# Patient Record
Sex: FEMALE | Race: WHITE | Employment: OTHER | URBAN - METROPOLITAN AREA
[De-identification: names, ages, dates, MRNs, and addresses within clinical notes are randomized per-mention and may not be internally consistent; named-entity substitution may affect disease eponyms.]

---

## 2023-01-19 ENCOUNTER — HOSPITAL ENCOUNTER (OUTPATIENT)
Dept: PHYSICAL THERAPY | Age: 73
Setting detail: RECURRING SERIES
Discharge: HOME | End: 2023-01-22
Payer: COMMERCIAL

## 2023-01-19 PROCEDURE — 97165 OT EVAL LOW COMPLEX 30 MIN: CPT

## 2023-01-19 PROCEDURE — 97535 SELF CARE MNGMENT TRAINING: CPT

## 2023-01-19 NOTE — THERAPY EVALUATION
Kerri Schwartz  : 1950  Primary: Meridee Odor (Commercial)  Secondary:  [address] @ [facility]  9 [address] [place] [address] [place] [address]  Phone: [phone]  Fax: [phone]    OT Visit Info:  Certification Period Expiration Date: 23  Total # of Visits to Date: 1   Visit Count: Visit count could not be calculated. Make sure you are using a visit which is associated with an episode. OUTPATIENT OCCUPATIONAL THERAPY:OP NOTE TYPE: Initial Assessment 2023  Episode: (BLE lymphedema)   Appt Desk        Treatment Diagnosis:  I89 Lymphedema, not elsewhere classified  I87.2 Venous insufficiency  M79.604 Pain in right leg  M79.605 Pain in left leg  R53.1 Weakness  M62.81 Muscle weakness, generalized  R53.83 Other fatigue    Medical/Referring Diagnosis:  Lymphedema, not elsewhere classified [I89.0]  Referring Physician:  Jewell Villavicencio MD MD Orders:  OT Eval and Treat   Return MD Appt:  TBD  Date of Onset:  Onset Date: 21   Allergies:  Patient has no allergy information on record. Restrictions/Precautions:    No data recorded  No data recorded  Medications Last Reviewed:  2023     SUBJECTIVE   History of Injury/Illness (Reason for Referral):  2023: Office visit (Dr. Woo Eastman):   Karolina Cordon is a known patient to our clinic, had seen Dr. Latha Nelson many years ago, had ablation on the left GSV, and sclerotherapy on the right lower extremity. She has been developing worsening bilateral venous stasis dermatitis, more severe on the right than the left. She has [place], severe obesity with a BMI of 42, as her main contributing factors. Cardiac echo from 8 years ago, showed grade 1 diastolic dysfunction. Patient has developed a small ulceration on the right lower extremity, traumatic. She has been seen previously Dr. Ruben Melton. She is at high risk for recurrence of ulceration, due to severe lipodermatosclerosis.  We checked her bedside, which showed incompetent  vein in the right mid medial calf. Patient uses Lasix intermittently. She has not had a recent cardiac or pulmonary evaluation. She uses CPAP every night. Without it, she is unable to sleep. Disposition: Cardiac echo due to bilateral lower extremity edema, severe orthopnea requiring daily usage of CPAP, and longstanding diabetes, Lasix daily 40 mg, continue Velcro wrapping and referred for lymphedema therapy for MLD, standing venous ultrasound bilateral lower extremity. Patient Stated Goal(s):  \"Reduce swelling\"  Initial:      7/10 Post Session:      5/10  Past Medical History/Comorbidities:   Ms. Brandon Deosuza  has no past medical history on file. Ms. Brandon Desouza  has no past surgical history on file. Social History/Living Environment:   Lives With: Alone  Type of Home: House     Prior Level of Function/Work/Activity:   Prior level of function: Independent  Occupation: Retired  No data recorded  No data recorded     Learning:   No data recorded     Fall Risk Scale  Buck Total Score: 0  [place] Fall Risk: Low (0-24)         OBJECTIVE   Lymphedema:  PRETREATMENT AFFECTED LIMB(s): lower extremity      Date:  1/19/23         Right / Left           Groin   []      []           8 inches   []      []           4 inches   []      []         PoplitealSpace   []      [] R41  L40          8 inches   []      [] R40.5  L41          4 inches   []      [] R32  L28          Ankle   []      [] R22.5  L23.5          Instep   []      [] R21.25  L21        Measurements are taken in centimeters:  2.54 cm = 1 inch   Cumulative circumferential volumetric graph measurements  RLE total size 157.25        LLE total size 153.5               Skin Integumentary:  Location Description: BLE  Pitting Scale Area 1: 3+  Skin Color: Red  Skin Texture: Dry  Edema Rebound: Slow  Stemmer Sign: Negative  LLIS:  Total Score: 25    ASSESSMENT   Initial Assessment:  Patient with BLE early stage 2. Redness with no infection. Dry but uses lotion daily.  Patient has visual CVI. 2+ pitting edema. Patient will benefit from CDT and MLD with transition to daily compression. Initiate OT. Problem List (Impacting functional limitations):  Performance deficits / Impairments: Decreased functional mobility ; Decreased ADL status; Decreased ROM; Decreased strength     Therapy Prognosis:   No data recorded     Assessment Complexity:   Low Complexity  PLAN   Effective Dates: 2/04/84  TO Certification Period Expiration Date: 04/19/23 . Frequency/Duration: No data recorded   Interventions Planned: (Treatment may consist of any combination of the following)  Current Treatment Recommendations: Strengthening; ROM; Self-Care / ADL; Patient/Caregiver education & training; Safety education & training; Equipment evaluation, education, & procurement; Manual Therapy:  MLD     Short-Term Functional Goals: Time Frame: 30 days  1. The patient/caregiver will verbalize understanding of lymphedema precautions. 2. Patient will be independent with skin care regimen to decrease risk of cellulitis. 3. The patient/caregiver will be independent at donning and doffing bilateral lower extremity compression bandages. 4. The patient/caregiver will be independent with self-manual lymph drainage techniques and show decrease in limb volume. 5. Patient will be independent in lymphatic exercises. Discharge Goals: Time Frame: 90 days  1. Patient's bilateral lower extremity circumferential measurements will decrease on volumetric graph to maximize functional use in ADL's.    2. The patient/caregiver will be independent with home management of lymphedema. 3. Patient/caregiver will be independent donning and doffing bilateral lower extremity compression garment. MEDICAL NECESSITY:   > Skilled intervention continues to be required due to Deficits noted above. REASON FOR SERVICES/OTHER COMMENTS:  > Patient continues to require skilled intervention due to Dx above.   Total Duration:  Time In: 1100  Time Out: 1200    Regarding Vincent Knapp's therapy, I certify that the treatment plan above will be carried out by a therapist or under their direction.   Thank you for this referral,  Maninder Mar OT     Referring Physician Signature: Oralia Morton MD _______________________________ Date _____________        Post Session Pain  Charge Capture   POC Link  Treatment Note Link  MD Guidelines  [facility]

## 2023-01-19 NOTE — PROGRESS NOTES
Elder Costa  : 1950  Primary: Lisha Srivastava (Commercial)  Secondary:  [address] @ [facility]  [address] [place] [address]  Phone: [phone]  Fax: [phone]    OT Visit Info:   Certification Period Expiration Date: 23  Total # of Visits to Date: 1        OUTPATIENT OCCUPATIONAL THERAPY: Treatment Note 2023  Episode: (BLE lymphedema)   Appt Desk      Treatment Diagnosis:  I89 Lymphedema, not elsewhere classified  I87.2 Venous insufficiency  M79.604 Pain in right leg  M79.605 Pain in left leg  R53.1 Weakness  M62.81 Muscle weakness, generalized  R53.83 Other fatigue  Medical/Referring Diagnosis:  Lymphedema, not elsewhere classified [I89.0]  Referring Physician:  Chris Park MD MD Orders:  OT Eval and Treat   Date of Onset:  Onset Date: 21     Allergies:  Patient has no allergy information on record. Restrictions/Precautions:    No data recorded  No data recorded  Medications Last Reviewed:  2023     Interventions Planned: (Treatment may consist of any combination of the following)  Current Treatment Recommendations: Strengthening; ROM; Self-Care / ADL; Patient/Caregiver education & training; Safety education & training; Equipment evaluation, education, & procurement; Manual Therapy:  MLD     Subjective Comments: \"I can only come once a week due to copay and travel. \"     Initial:      7/10 Post Session:      5/10  Medications Last Reviewed:  2023  Updated Objective Findings:  See evaluation note from today  Treatment   Manual Lymph Drainage:   Lymph Nodes:    Cervical Supraclavicular Axillary Abdominal Inguinal Popliteal Antecubital   RIGHT []     []     []     []     []     []     []       LEFT []     []     []     []     []     []     []         Anastamoses:   Axillo-axillary Inguino-inguinal Axillo-inguinal Inguino-axillary   ANTERIOR []     []     []     []       POSTERIOR []     []     []     []       RIGHT []     [] []     []       LEFT []     []     []     []         Limbs:   []    RUE     []    LUE     []    RLE    []    LLE    Self Care: (30 minutes): Eucerin for skin care. Cotton liner. 2 short bandages each leg. Educated to remove if painful. Educated about exercises in bandages. Treatment/Session Summary:    Treatment Assessment:  Patient to wear compression wraps until sat if able. Apply Juzo socks after removal of wraps. Communication/Consultation:  None today  Equipment provided today:  4 bandages, 2 liners. Recommendations/Intent for next treatment session: Next visit will focus on MLD and CDT. Adolph Dodd     Total Treatment Billable Duration: 35 minutes  OT Individual Minutes  Time In: 1100  Time Out: 1200  Minutes: [address] [place] [address], OT    Post Session Pain  Charge Capture   POC Link  Treatment Note Link  MD Guidelines  MyChart    Future Appointments   Date Time Provider Braydon Mendes   1/25/2023  2:00 PM Virlinda Old, [facility]   2/2/2023 11:00 AM Virlinda Old, OT [facility]   2/9/2023 11:00 AM Virlinda Old, OT [facility]   2/15/2023 10:00 AM Virlinda Old, OT [facility]   2/22/2023 10:00 AM Virlinda Old, OT [facility]   3/1/2023 10:00 AM Virlinda Old, OT [facility]   3/8/2023 10:00 AM Virlinda Old, OT [facility]

## 2023-01-25 ENCOUNTER — HOSPITAL ENCOUNTER (OUTPATIENT)
Dept: PHYSICAL THERAPY | Age: 73
Setting detail: RECURRING SERIES
Discharge: HOME | End: 2023-01-28
Payer: COMMERCIAL

## 2023-01-25 PROCEDURE — 97140 MANUAL THERAPY 1/> REGIONS: CPT

## 2023-01-25 PROCEDURE — 97535 SELF CARE MNGMENT TRAINING: CPT

## 2023-01-25 NOTE — PROGRESS NOTES
Aishwarya Acuna  : 1950  Primary: [facility] (Commercial)  Secondary:  [address] @ [facility]  [address] [place] [address]  Phone: [phone]  Fax: [phone]    OT Visit Info:   Certification Period Expiration Date: 23  Total # of Visits to Date: 2        OUTPATIENT OCCUPATIONAL THERAPY: Treatment Note 2023  Episode: (BLE lymphedema)   Appt Desk      Treatment Diagnosis:  I89 Lymphedema, not elsewhere classified  I87.2 Venous insufficiency  M79.604 Pain in right leg  M79.605 Pain in left leg  R53.1 Weakness  M62.81 Muscle weakness, generalized  R53.83 Other fatigue  Medical/Referring Diagnosis:  Lymphedema, not elsewhere classified [I89.0]  Referring Physician:  Iman Bermeo MD MD Orders:  OT Eval and Treat   Date of Onset:  Onset Date: 21     Allergies:  Patient has no allergy information on record. Restrictions/Precautions:    No data recorded  No data recorded  Medications Last Reviewed:  2023     Interventions Planned: (Treatment may consist of any combination of the following)  Current Treatment Recommendations: Strengthening; ROM; Self-Care / ADL; Patient/Caregiver education & training; Safety education & training; Equipment evaluation, education, & procurement; Manual Therapy:  MLD     Subjective Comments: \"My legs are smaller and didn't really fill up again. \"     Initial:      7/10 Post Session:      5/10  Medications Last Reviewed:  2023  Updated Objective Findings:  See evaluation note from today  Treatment   Manual Lymph Drainage: (30 Minutes) Sitting up in recliner, legs elevated  Lymph Nodes:    Cervical Supraclavicular Axillary Abdominal Inguinal Popliteal Antecubital   RIGHT []     []     []     []     []     []     []       LEFT []     []     []     []     []     []     []         Anastamoses:   Axillo-axillary Inguino-inguinal Axillo-inguinal Inguino-axillary   ANTERIOR []     []     []     [] POSTERIOR []     []     []     []       RIGHT []     []     []     []       LEFT []     []     []     []         Limbs:   []    RUE     []    LUE     []    RLE    []    LLE    Self Care: (30 minutes): Eucerin for skin care. Cotton liner. 3 short bandages each leg. Educated to remove if painful. Educated about exercises in bandages. Added rosidal foam 1/26/23      Treatment/Session Summary:    Treatment Assessment:  Patient to wear until Friday and remove and apply Juzo compression after. Communication/Consultation:  None today  Equipment provided today:  2 bandages. Recommendations/Intent for next treatment session: Next visit will focus on MLD and CDT. Rosie Remedies     Total Treatment Billable Duration: 60 minutes  OT Individual Minutes  Time In: 7546  Time Out: 5431  Minutes: [address] [place] [address]    Post Session Pain  Charge Capture   POC Link  Treatment Note Link  MD Guidelines  MyChart    Future Appointments   Date Time Provider Braydon Verdei   2/2/2023 11:00 AM Mariya Penn OT [facility]   2/9/2023 11:00 AM TABITHA [facility]ikOST [facility]   2/15/2023 10:00 AM TABITHA [facility]OST [facility]   2/22/2023  7:00 PM TABITHA NaikOST [facility]   3/1/2023 10:00 AM TABITHA NaikO 0.2

## 2023-02-01 ENCOUNTER — APPOINTMENT (OUTPATIENT)
Dept: PHYSICAL THERAPY | Age: 73
End: 2023-02-01
Payer: COMMERCIAL

## 2023-02-02 ENCOUNTER — HOSPITAL ENCOUNTER (OUTPATIENT)
Dept: PHYSICAL THERAPY | Age: 73
Setting detail: RECURRING SERIES
Discharge: HOME | End: 2023-02-05
Payer: COMMERCIAL

## 2023-02-02 PROCEDURE — 97535 SELF CARE MNGMENT TRAINING: CPT

## 2023-02-02 PROCEDURE — 97140 MANUAL THERAPY 1/> REGIONS: CPT

## 2023-02-02 NOTE — PROGRESS NOTES
Bernice Ro  : 1950  Primary: [employer] (Commercial)  Secondary:  [address] @ [address] [facility]  [address] [place] [address]  Phone: [phone]  Fax: [phone]    OT Visit Info:   Certification Period Expiration Date: 23  Total # of Visits to Date: 2        OUTPATIENT OCCUPATIONAL THERAPY: Treatment Note 2023  Episode: (BLE lymphedema)   Appt Desk      Treatment Diagnosis:  I89 Lymphedema, not elsewhere classified  I87.2 Venous insufficiency  M79.604 Pain in right leg  M79.605 Pain in left leg  R53.1 Weakness  M62.81 Muscle weakness, generalized  R53.83 Other fatigue  Medical/Referring Diagnosis:  Lymphedema, not elsewhere classified [I89.0]  Referring Physician:  Denise Polk MD MD Orders:  OT Eval and Treat   Date of Onset:  Onset Date: 21     Allergies:  Patient has no allergy information on record. Restrictions/Precautions:    No data recorded  No data recorded  Medications Last Reviewed:  2023     Interventions Planned: (Treatment may consist of any combination of the following)  Current Treatment Recommendations: Strengthening; ROM; Self-Care / ADL; Patient/Caregiver education & training; Safety education & training; Equipment evaluation, education, & procurement; Manual Therapy:  MLD     Subjective Comments: \"My legs are smaller and didn't really fill up again. \"     Initial:      7/10 Post Session:      5/10  Medications Last Reviewed:  2023  Updated Objective Findings:  None Today  Treatment   Manual Lymph Drainage: (30 Minutes) Sitting up in recliner, legs elevated  Lymph Nodes:    Cervical Supraclavicular Axillary Abdominal Inguinal Popliteal Antecubital   RIGHT []     []     []     []     []     []     []       LEFT []     []     []     []     []     []     []         Anastamoses:   Axillo-axillary Inguino-inguinal Axillo-inguinal Inguino-axillary   ANTERIOR []     []     []     []       POSTERIOR []     []     [] []       RIGHT []     []     []     []       LEFT []     []     []     []         Limbs:   []    RUE     []    LUE     [x]    RLE    [x]    LLE    Self Care: (30 minutes): Eucerin for skin care. Cotton liner. 3 short bandages each leg. Educated to remove if painful. Educated about exercises in bandages. Added rosidal foam 1/26/23      Treatment/Session Summary:    Treatment Assessment:  Ordered 2 pairs of size 2, short, knee high Dynamic stockings, closed toes, with donning slip aid. Communication/Consultation:  None today  Equipment provided today:  0 bandages. 1 rosidal foam  Recommendations/Intent for next treatment session: Next visit will focus on MLD and CDT. Siena Smith     Total Treatment Billable Duration: 60 minutes  OT Individual Minutes  Time In: 1100  Time Out: 1200  Minutes: [address] [place] [address]    Post Session Pain  Charge Capture   POC Link  Treatment Note Link  MD Guidelines  MyChart    Future Appointments   Date Time Provider [facility]   2/9/2023 11:00 AM Veatrice Stake, OT [facility]   2/13/2023  9:00 AM Veatrice Stake, OT [facility]   2/17/2023 10:00 AM Veatrice Stake, OT [facility]   2/22/2023  7:00 PM Veatrice Stake, OT [facility]   3/1/2023 10:00 AM Veatrice Stake, OT [facility]

## 2023-02-08 ENCOUNTER — APPOINTMENT (OUTPATIENT)
Dept: PHYSICAL THERAPY | Age: 73
End: 2023-02-08
Payer: COMMERCIAL

## 2023-02-09 ENCOUNTER — APPOINTMENT (OUTPATIENT)
Dept: PHYSICAL THERAPY | Age: 73
End: 2023-02-09
Payer: COMMERCIAL

## 2023-02-13 ENCOUNTER — HOSPITAL ENCOUNTER (OUTPATIENT)
Dept: PHYSICAL THERAPY | Age: 73
Setting detail: RECURRING SERIES
Discharge: HOME | End: 2023-02-16
Payer: COMMERCIAL

## 2023-02-13 PROCEDURE — 97535 SELF CARE MNGMENT TRAINING: CPT

## 2023-02-13 PROCEDURE — 97140 MANUAL THERAPY 1/> REGIONS: CPT

## 2023-02-13 NOTE — PROGRESS NOTES
Jillian Lewis  : 1950  Primary: Danielle Jacobson (Commercial)  Secondary:  [address] @ [facility]  [address] [place] [address]  Phone: [phone]  Fax: [phone]    OT Visit Info:   Certification Period Expiration Date: 23  Total # of Visits to Date: 4        OUTPATIENT OCCUPATIONAL THERAPY: Treatment Note 2023  Episode: (BLE lymphedema)   Appt Desk      Treatment Diagnosis:  I89 Lymphedema, not elsewhere classified  I87.2 Venous insufficiency  M79.604 Pain in right leg  M79.605 Pain in left leg  R53.1 Weakness  M62.81 Muscle weakness, generalized  R53.83 Other fatigue  Medical/Referring Diagnosis:  Lymphedema, not elsewhere classified [I89.0]  Referring Physician:  Rupesh Peña MD MD Orders:  OT Eval and Treat   Date of Onset:  Onset Date: 21     Allergies:  Patient has no allergy information on record. Restrictions/Precautions:    No data recorded  No data recorded  Medications Last Reviewed:  2023     Interventions Planned: (Treatment may consist of any combination of the following)  Current Treatment Recommendations: Strengthening; ROM; Self-Care / ADL; Patient/Caregiver education & training; Safety education & training; Equipment evaluation, education, & procurement; Manual Therapy:  MLD     Subjective Comments: \"My legs are smaller and didn't really fill up again. \"     Initial:      4/10 Post Session:      4/10  Medications Last Reviewed:  2023  Updated Objective Findings:  None Today  Treatment   Manual Lymph Drainage: (30 Minutes) Sitting up in recliner, legs elevated  Lymph Nodes:    Cervical Supraclavicular Axillary Abdominal Inguinal Popliteal Antecubital   RIGHT []     []     []     []     []     []     []       LEFT []     []     []     []     []     []     []         Anastamoses:   Axillo-axillary Inguino-inguinal Axillo-inguinal Inguino-axillary   ANTERIOR []     []     []     []       POSTERIOR []     [] []     []       RIGHT []     []     []     []       LEFT []     []     []     []         Limbs:   []    RUE     []    LUE     [x]    RLE    [x]    LLE    Self Care: (30 minutes): Eucerin for skin care. Cotton liner. 3 short bandages each leg. Educated to remove if painful. Educated about exercises in bandages. Added rosidal foam 1/26/23      Treatment/Session Summary:    Treatment Assessment:  knee high Dynamic stockings, closed toes, size 2, with donning slip aid fit well and she showed ability to don well. To apply after removal of knee highs. Communication/Consultation:  None today  Equipment provided today:  0 bandages. Recommendations/Intent for next treatment session: Next visit will focus on MLD and CDT. Agusto Rush     Total Treatment Billable Duration: 60 minutes  OT Individual Minutes  Time In: 0900  Time Out: 1000  Minutes: [address] [place], [place]    Post Session Pain  Charge Capture   POC Link  Treatment Note Link  MD Guidelines  MyChart    Future Appointments   Date Time Provider Braydon Mendes   2/17/2023 10:00 AM Keri Pollack OT [facility]   2/22/2023  7:00 PM Keri Pollack OT [facility]   3/1/2023 10:00 AM Keri Pollack, OT [facility]

## 2023-02-15 ENCOUNTER — APPOINTMENT (OUTPATIENT)
Dept: PHYSICAL THERAPY | Age: 73
End: 2023-02-15
Payer: COMMERCIAL

## 2023-02-17 ENCOUNTER — APPOINTMENT (OUTPATIENT)
Dept: PHYSICAL THERAPY | Age: 73
End: 2023-02-17
Payer: COMMERCIAL

## 2023-02-22 ENCOUNTER — APPOINTMENT (OUTPATIENT)
Dept: PHYSICAL THERAPY | Age: 73
End: 2023-02-22
Payer: COMMERCIAL

## 2024-01-02 NOTE — PROGRESS NOTES
[facility]  [address]  [place] [address]  [phone]    Patient Name:  Cecy Knapp  YOB: 1950      Office Visit 1/3/2024    CHIEF COMPLAINT:    Chief Complaint   Patient presents with    CPAP/BiPAP    Sleep Apnea    New Patient       HISTORY OF PRESENT ILLNESS:      The patient presents for management of obstructive sleep apnea. PMH includes anxiety, T2DM ,[place]ST[place], GERD, HTN and depression.     Patient was diagnosed with overall severe sleep apnea in 2010. She is prescribed cpap therapy with a humidifier set at 4-15cm with a nasal mask. Most recent download reveals AHI on PAP therapy is 1.4, leak is 44.8 and the hourly usage is 5 hours 6 minutes nightly. The overall use is 453 hours with days greater than four hours at 66/90. The patient is compliant with the Pap therapy and is feeling better as a result.     Patient denies any snoring on CPAP.  Denies any vivid dreams, parasomnias or RLS symptoms.  She is falling asleep easily with trazodone and denies any frequent nocturnal awakenings.  She states sometimes she will stay up later than normal however typical bedtime is 1 AM until 9 AM.  She is typically sleeping in the lateral and supine position.  She is rested in the morning, denies any daytime fatigue or napping.  Current [place] score is 8/24.  Denies any morning headaches but does have dry mouth.  She is using a nasal mask and we discussed either switching to a full face or adding a chinstrap, she would like to add a chinstrap.    Denies any tobacco or alcohol, drinks 1 cup of coffee per day.  She is taking trazodone 100 mg nightly, lorazepam twice daily.  She does have lower extremity edema however does have varicose vein issues and does see a vein specialist for this.  Denies any issues with hypertension.  She states CPAP is functioning well.  No copy of sleep study is available for review but we have requested.  She does understand that if

## 2024-01-03 ENCOUNTER — OFFICE VISIT (OUTPATIENT)
Dept: SLEEP MEDICINE | Age: 74
End: 2024-01-03
Payer: COMMERCIAL

## 2024-01-03 VITALS
HEART RATE: 70 BPM | WEIGHT: 207.4 LBS | RESPIRATION RATE: 16 BRPM | TEMPERATURE: 96.9 F | OXYGEN SATURATION: 97 % | BODY MASS INDEX: 39.16 KG/M2 | SYSTOLIC BLOOD PRESSURE: 100 MMHG | HEIGHT: 61 IN | DIASTOLIC BLOOD PRESSURE: 56 MMHG

## 2024-01-03 DIAGNOSIS — G47.33 OSA (OBSTRUCTIVE SLEEP APNEA): Primary | ICD-10-CM

## 2024-01-03 DIAGNOSIS — G47.00 PERSISTENT DISORDER OF INITIATING OR MAINTAINING SLEEP: ICD-10-CM

## 2024-01-03 DIAGNOSIS — E66.9 OBESITY (BMI 30-39.9): ICD-10-CM

## 2024-01-03 PROCEDURE — 1123F ACP DISCUSS/DSCN MKR DOCD: CPT | Performed by: STUDENT IN AN ORGANIZED HEALTH CARE EDUCATION/TRAINING PROGRAM

## 2024-01-03 PROCEDURE — 99203 OFFICE O/P NEW LOW 30 MIN: CPT | Performed by: STUDENT IN AN ORGANIZED HEALTH CARE EDUCATION/TRAINING PROGRAM

## 2024-01-03 RX ORDER — DULOXETIN HYDROCHLORIDE 30 MG/1
30 CAPSULE, DELAYED RELEASE ORAL DAILY
COMMUNITY
Start: 2023-12-18

## 2024-01-03 RX ORDER — BIOTIN 2500 MCG
5000 CAPSULE ORAL DAILY
COMMUNITY

## 2024-01-03 RX ORDER — TRAZODONE HYDROCHLORIDE 100 MG/1
100 TABLET ORAL NIGHTLY
COMMUNITY
Start: 2023-07-25 | End: 2024-07-19

## 2024-01-03 RX ORDER — ASPIRIN 81 MG/1
81 TABLET ORAL DAILY
COMMUNITY

## 2024-01-03 RX ORDER — LOSARTAN POTASSIUM 50 MG/1
50 TABLET ORAL DAILY
COMMUNITY
Start: 2023-07-25 | End: 2024-07-19

## 2024-01-03 RX ORDER — ATORVASTATIN CALCIUM 40 MG/1
40 TABLET, FILM COATED ORAL
COMMUNITY
Start: 2023-07-25

## 2024-01-03 RX ORDER — CELECOXIB 100 MG/1
100 CAPSULE ORAL 2 TIMES DAILY
COMMUNITY
Start: 2023-07-25 | End: 2024-07-19

## 2024-01-03 RX ORDER — DULOXETIN HYDROCHLORIDE 60 MG/1
60 CAPSULE, DELAYED RELEASE ORAL DAILY
COMMUNITY
Start: 2023-12-18

## 2024-01-03 RX ORDER — OMEPRAZOLE 20 MG/1
20 CAPSULE, DELAYED RELEASE ORAL DAILY
COMMUNITY
Start: 2023-12-18

## 2024-01-03 RX ORDER — LORAZEPAM 1 MG/1
1 TABLET ORAL 2 TIMES DAILY
COMMUNITY

## 2024-01-03 RX ORDER — FUROSEMIDE 40 MG/1
40 TABLET ORAL DAILY
COMMUNITY
Start: 2023-11-22

## 2024-01-03 RX ORDER — MELATONIN
1000 DAILY
COMMUNITY

## 2024-01-03 ASSESSMENT — SLEEP AND FATIGUE QUESTIONNAIRES
HOW LIKELY ARE YOU TO NOD OFF OR FALL ASLEEP IN A CAR, WHILE STOPPED FOR A FEW MINUTES IN TRAFFIC: 0
HOW LIKELY ARE YOU TO NOD OFF OR FALL ASLEEP WHILE WATCHING TV: 3
ESS TOTAL SCORE: 8
HOW LIKELY ARE YOU TO NOD OFF OR FALL ASLEEP WHILE SITTING QUIETLY AFTER LUNCH WITHOUT ALCOHOL: 0
HOW LIKELY ARE YOU TO NOD OFF OR FALL ASLEEP WHILE SITTING INACTIVE IN A PUBLIC PLACE: 2
HOW LIKELY ARE YOU TO NOD OFF OR FALL ASLEEP WHEN YOU ARE A PASSENGER IN A CAR FOR AN HOUR WITHOUT A BREAK: 0
HOW LIKELY ARE YOU TO NOD OFF OR FALL ASLEEP WHILE SITTING AND READING: 1
HOW LIKELY ARE YOU TO NOD OFF OR FALL ASLEEP WHILE SITTING AND TALKING TO SOMEONE: 1
HOW LIKELY ARE YOU TO NOD OFF OR FALL ASLEEP WHILE LYING DOWN TO REST IN THE AFTERNOON WHEN CIRCUMSTANCES PERMIT: 1

## 2024-01-03 NOTE — PATIENT INSTRUCTIONS
The company who will be taking care of your CPAP supplies is:    Address: [address], [place] [address]  Phone: [phone] Option #1  Fax: [phone]    If we are unable to obtain a copy of your sleep study I will need to order a home sleep study. You will need to stop your CPAP for 2 days prior to the home sleep study.    You will get a call from [employer] to schedule your at-home sleep study in the next 24-48 hours.  This device will be mailed to you. It is a 2 night sleep study and once completed, you will return device to address provided.      A physician will read the study and you will receive a call from our office with results or to schedule a follow-up appointment with the Sleep Center to discuss treatment options.

## 2024-01-10 ENCOUNTER — TELEPHONE (OUTPATIENT)
Dept: SLEEP MEDICINE | Age: 74
End: 2024-01-10

## 2024-01-10 NOTE — TELEPHONE ENCOUNTER
CALLED PT PER [facility] RELAYED MESSAGE.  SHE VOICED UNDERSTANDING.      ----- Message from BRANDON Babb CNP sent at 1/10/2024 11:12 AM EST -----  Please call patient and let her know that she will not need repeat sleep study. We were able to get copy of sleep study.  BRANDON Babb CNP

## 2024-04-15 ENCOUNTER — TELEPHONE (OUTPATIENT)
Dept: SLEEP MEDICINE | Age: 74
End: 2024-04-15

## 2024-04-15 DIAGNOSIS — G47.33 OSA (OBSTRUCTIVE SLEEP APNEA): Primary | ICD-10-CM

## 2024-04-15 NOTE — TELEPHONE ENCOUNTER
Pt states she is having trouble getting supplies for CPAP. She was with [employer]. Now it is [employer]. She needs a new order for a new mask. She also needs a different mask because the one they sent caused her to break out.     Please call her at [phone].      She is also interested in [employer] original products.

## 2024-04-22 NOTE — TELEPHONE ENCOUNTER
Called to [facility] spoke to Boone, he states pt is eligible for new mask, but she just received he cushions in feb. Insurance will not pay right now. Boone will have resp therapist call pt  Called Made her aware .  She voiced understanding.     Pt  was seen 01/03/2024 . Pt Would like to pursue inspire.   Please advise

## 2024-04-24 NOTE — TELEPHONE ENCOUNTER
Called pt relayed the message per Sylvia  Pt states she would like to do HST   EMPHASIZED TO PT SHE WILL NEED TO BE OFF CPAP 2-3 DAY PRIOR TO STUDY PT VOICED UNDERSTANDING.   GAVE HER [employer]  PH. NUMBER  ORDER PLACED FOR HST.       Sylvia Hancock APRN - Clarissa Jovel MA  Caller: Unspecified (1 week ago)  Please let her know that we can certainly pursue inspire workup. She will need an HST to qualify. She will need to come off CPAP for 2-3 days prior to the sleep study.

## 2024-05-17 ENCOUNTER — HOSPITAL ENCOUNTER (OUTPATIENT)
Dept: SLEEP CENTER | Age: 74
Discharge: HOME | End: 2024-05-20
Payer: MEDICARE

## 2024-05-17 PROCEDURE — 95806 SLEEP STUDY UNATT&RESP EFFT: CPT

## 2024-05-28 ENCOUNTER — TELEPHONE (OUTPATIENT)
Dept: SLEEP MEDICINE | Age: 74
End: 2024-05-28

## 2024-05-29 DIAGNOSIS — G47.34 NOCTURNAL HYPOXEMIA: ICD-10-CM

## 2024-05-29 DIAGNOSIS — G47.33 OSA (OBSTRUCTIVE SLEEP APNEA): Primary | ICD-10-CM

## 2024-05-29 DIAGNOSIS — G47.00 PERSISTENT DISORDER OF INITIATING OR MAINTAINING SLEEP: ICD-10-CM

## 2024-05-29 DIAGNOSIS — Z78.9 DIFFICULTY WITH CPAP USE: ICD-10-CM

## 2024-06-12 ENCOUNTER — OFFICE VISIT (OUTPATIENT)
Dept: ENT CLINIC | Age: 74
End: 2024-06-12
Payer: MEDICARE

## 2024-06-12 VITALS
RESPIRATION RATE: 17 BRPM | WEIGHT: 189 LBS | SYSTOLIC BLOOD PRESSURE: 118 MMHG | BODY MASS INDEX: 35.68 KG/M2 | HEIGHT: 61 IN | DIASTOLIC BLOOD PRESSURE: 76 MMHG

## 2024-06-12 DIAGNOSIS — J38.7: ICD-10-CM

## 2024-06-12 DIAGNOSIS — J34.3 NASAL TURBINATE HYPERTROPHY: ICD-10-CM

## 2024-06-12 DIAGNOSIS — J34.89 NASAL OBSTRUCTION: ICD-10-CM

## 2024-06-12 DIAGNOSIS — G47.33 OSA ON CPAP: Primary | ICD-10-CM

## 2024-06-12 DIAGNOSIS — J34.2 DEVIATED NASAL SEPTUM: ICD-10-CM

## 2024-06-12 PROCEDURE — G8427 DOCREV CUR MEDS BY ELIG CLIN: HCPCS | Performed by: STUDENT IN AN ORGANIZED HEALTH CARE EDUCATION/TRAINING PROGRAM

## 2024-06-12 PROCEDURE — 99204 OFFICE O/P NEW MOD 45 MIN: CPT | Performed by: STUDENT IN AN ORGANIZED HEALTH CARE EDUCATION/TRAINING PROGRAM

## 2024-06-12 PROCEDURE — 3017F COLORECTAL CA SCREEN DOC REV: CPT | Performed by: STUDENT IN AN ORGANIZED HEALTH CARE EDUCATION/TRAINING PROGRAM

## 2024-06-12 PROCEDURE — 1036F TOBACCO NON-USER: CPT | Performed by: STUDENT IN AN ORGANIZED HEALTH CARE EDUCATION/TRAINING PROGRAM

## 2024-06-12 PROCEDURE — 31575 DIAGNOSTIC LARYNGOSCOPY: CPT | Performed by: STUDENT IN AN ORGANIZED HEALTH CARE EDUCATION/TRAINING PROGRAM

## 2024-06-12 PROCEDURE — 1123F ACP DISCUSS/DSCN MKR DOCD: CPT | Performed by: STUDENT IN AN ORGANIZED HEALTH CARE EDUCATION/TRAINING PROGRAM

## 2024-06-12 PROCEDURE — G8417 CALC BMI ABV UP PARAM F/U: HCPCS | Performed by: STUDENT IN AN ORGANIZED HEALTH CARE EDUCATION/TRAINING PROGRAM

## 2024-06-12 PROCEDURE — G8400 PT W/DXA NO RESULTS DOC: HCPCS | Performed by: STUDENT IN AN ORGANIZED HEALTH CARE EDUCATION/TRAINING PROGRAM

## 2024-06-12 PROCEDURE — 1090F PRES/ABSN URINE INCON ASSESS: CPT | Performed by: STUDENT IN AN ORGANIZED HEALTH CARE EDUCATION/TRAINING PROGRAM

## 2024-06-12 RX ORDER — TIRZEPATIDE 5 MG/.5ML
5 INJECTION, SOLUTION SUBCUTANEOUS
COMMUNITY
Start: 2024-03-29 | End: 2024-06-27

## 2024-06-12 NOTE — PROGRESS NOTES
HPI:  Cecy Knapp is a 73 y.o. female seen New    Chief Complaint   Patient presents with    Sleep Apnea     Patient presents today to discuss possible INSPIRE implant. Patient states that she currently is having difficulty with CPAP mask while sleeping . Patient does note frequent sinus congestion and occasional nasal soreness.     Hoarse     Patient also notes voice changes , difficulty singing , and scratchy throat x 1.5 year . Patient has history of thyroid nodules.        73-year-old female seen as a new patient referral evaluation with history of WESTON here for consideration of inspire implant.  Being referred over from sleep medicine.  She has had WESTON for at least the last 15 years and has been on a CPAP.  Long-term she has utilized a nasal mask which initially had provided great benefit.  Unfortunately in the recent couple years she has had worsening concerns of removing her mask while she is asleep.  This is causing worsening night and daytime symptoms of fatigue and overall sleep disturbance.  She had an updated sleep study on 5/17/2024 which revealed moderate WESTON with an AHI of 23.  She also endorses having long-term sinus congestion nasal airway obstruction right greater than left.  She is also been experiencing some vocal changes scratchy throat and worsening dysphonia for the last 1.5 years.  She also notes a history of thyroid nodules which were biopsied several years ago benign and followed for several years without change.    Past Medical History, Past Surgical History, Family history, Social History, and Medications were all reviewed with the patient today and updated as necessary.     Allergies   Allergen Reactions    Bee Venom Anaphylaxis, Nausea And Vomiting, Palpitations and Shortness Of Breath    Sodium Tetradecyl Sulfate Anaphylaxis    Wasp Venom Protein Anaphylaxis    Erythromycin Nausea And Vomiting     Other Reaction(s): Rash-Allergy    Sulfamethoxazole-Trimethoprim      Other

## 2024-12-31 NOTE — PROGRESS NOTES
[facility]  [address]  [place], [place] [address] [phone]    Patient Name:  Cecy Knapp  YOB: 1950    Cecy Knapp, was evaluated through a synchronous (real-time) audio-video encounter. The patient (or guardian if applicable) is aware that this is a billable service, which includes applicable co-pays. This Virtual Visit was conducted with patient's (and/or legal guardian's) consent. Patient identification was verified, and a caregiver was present when appropriate.   The patient was located at Home: [address] [place] [address]  Provider was located at Home (Appt Dept State): [place]  Confirm you are appropriately licensed, registered, or certified to deliver care in the state where the patient is located as indicated above. If you are not or unsure, please re-schedule the visit: Yes, I confirm.          --Xochitl Ignacio, BRANDON - CNP on 1/3/2025 at 11:02 AM             Office Visit 1/3/2025    CHIEF COMPLAINT:    Chief Complaint   Patient presents with    Sleep Apnea       HISTORY OF PRESENT ILLNESS:  Patient is being seen today via virtual visit.     She was diagnosed with severe sleep apnea in 2010.  She is prescribed auto CPAP 4 to 12 cm using a nasal mask.  Download reveals 92% compliance over the past year with average nightly use 4 hours and 55 minutes.  AHI is 2.1 events per hour with average pressure use 6.8 to 11 cm.  She states that she falls to sleep easily and sleeps 4 to 5 hours a night.  She is taking trazodone 100 mg and Ativan 1 mg nightly.  Sometimes she is unable to fall back to sleep.  She denies any issues with mask or pressure.  She states that she recently lost 30 pounds while on Mounjaro but gained about 10 pounds after she stopped taking it due to cost.  She reports a current weight of 180 pounds.  She denies any morning headaches.  We did discuss adding melatonin extended release to help with sleep

## 2025-01-03 ENCOUNTER — TELEMEDICINE (OUTPATIENT)
Dept: SLEEP MEDICINE | Age: 75
End: 2025-01-03

## 2025-01-03 DIAGNOSIS — G47.33 OSA (OBSTRUCTIVE SLEEP APNEA): Primary | ICD-10-CM

## 2025-01-03 DIAGNOSIS — G47.00 PERSISTENT DISORDER OF INITIATING OR MAINTAINING SLEEP: ICD-10-CM

## 2025-01-03 DIAGNOSIS — E66.9 OBESITY (BMI 30-39.9): ICD-10-CM

## 2025-01-03 ASSESSMENT — SLEEP AND FATIGUE QUESTIONNAIRES
HOW LIKELY ARE YOU TO NOD OFF OR FALL ASLEEP WHILE LYING DOWN TO REST IN THE AFTERNOON WHEN CIRCUMSTANCES PERMIT: MODERATE CHANCE OF DOZING
HOW LIKELY ARE YOU TO NOD OFF OR FALL ASLEEP WHEN YOU ARE A PASSENGER IN A CAR FOR AN HOUR WITHOUT A BREAK: WOULD NEVER DOZE
HOW LIKELY ARE YOU TO NOD OFF OR FALL ASLEEP WHILE SITTING AND READING: HIGH CHANCE OF DOZING
HOW LIKELY ARE YOU TO NOD OFF OR FALL ASLEEP WHILE SITTING INACTIVE IN A PUBLIC PLACE: SLIGHT CHANCE OF DOZING
HOW LIKELY ARE YOU TO NOD OFF OR FALL ASLEEP WHILE SITTING AND TALKING TO SOMEONE: WOULD NEVER DOZE
ESS TOTAL SCORE: 7
HOW LIKELY ARE YOU TO NOD OFF OR FALL ASLEEP WHILE SITTING QUIETLY AFTER LUNCH WITHOUT ALCOHOL: SLIGHT CHANCE OF DOZING
HOW LIKELY ARE YOU TO NOD OFF OR FALL ASLEEP WHILE WATCHING TV: WOULD NEVER DOZE
HOW LIKELY ARE YOU TO NOD OFF OR FALL ASLEEP IN A CAR, WHILE STOPPED FOR A FEW MINUTES IN TRAFFIC: WOULD NEVER DOZE